# Patient Record
Sex: FEMALE | Race: WHITE | NOT HISPANIC OR LATINO | Employment: FULL TIME | URBAN - METROPOLITAN AREA
[De-identification: names, ages, dates, MRNs, and addresses within clinical notes are randomized per-mention and may not be internally consistent; named-entity substitution may affect disease eponyms.]

---

## 2021-05-10 ENCOUNTER — LAB (OUTPATIENT)
Dept: LAB | Facility: HOSPITAL | Age: 26
End: 2021-05-10

## 2021-05-10 ENCOUNTER — TRANSCRIBE ORDERS (OUTPATIENT)
Dept: LAB | Facility: HOSPITAL | Age: 26
End: 2021-05-10

## 2021-05-10 DIAGNOSIS — Z3A.01 LESS THAN 8 WEEKS GESTATION OF PREGNANCY: ICD-10-CM

## 2021-05-10 DIAGNOSIS — Z34.81 PRENATAL CARE, SUBSEQUENT PREGNANCY, FIRST TRIMESTER: ICD-10-CM

## 2021-05-10 DIAGNOSIS — Z3A.01 LESS THAN 8 WEEKS GESTATION OF PREGNANCY: Primary | ICD-10-CM

## 2021-05-10 LAB
ABO GROUP BLD: NORMAL
AMPHET+METHAMPHET UR QL: NEGATIVE
AMPHETAMINES UR QL: NEGATIVE
BARBITURATES UR QL SCN: NEGATIVE
BASOPHILS # BLD AUTO: 0.05 10*3/MM3 (ref 0–0.2)
BASOPHILS NFR BLD AUTO: 0.7 % (ref 0–1.5)
BENZODIAZ UR QL SCN: NEGATIVE
BILIRUB UR QL STRIP: NEGATIVE
BLD GP AB SCN SERPL QL: NEGATIVE
BUPRENORPHINE SERPL-MCNC: NEGATIVE NG/ML
CANNABINOIDS SERPL QL: NEGATIVE
CLARITY UR: CLEAR
COCAINE UR QL: NEGATIVE
COLOR UR: YELLOW
DEPRECATED RDW RBC AUTO: 42.3 FL (ref 37–54)
EOSINOPHIL # BLD AUTO: 0.05 10*3/MM3 (ref 0–0.4)
EOSINOPHIL NFR BLD AUTO: 0.7 % (ref 0.3–6.2)
ERYTHROCYTE [DISTWIDTH] IN BLOOD BY AUTOMATED COUNT: 12.6 % (ref 12.3–15.4)
GLUCOSE SERPL-MCNC: 82 MG/DL (ref 65–99)
GLUCOSE UR STRIP-MCNC: NEGATIVE MG/DL
HBV SURFACE AG SERPL QL IA: NORMAL
HCT VFR BLD AUTO: 40.7 % (ref 34–46.6)
HCV AB SER DONR QL: NORMAL
HGB BLD-MCNC: 14.1 G/DL (ref 12–15.9)
HGB UR QL STRIP.AUTO: NEGATIVE
HIV1+2 AB SER QL: NORMAL
IMM GRANULOCYTES # BLD AUTO: 0.03 10*3/MM3 (ref 0–0.05)
IMM GRANULOCYTES NFR BLD AUTO: 0.4 % (ref 0–0.5)
KETONES UR QL STRIP: ABNORMAL
LEUKOCYTE ESTERASE UR QL STRIP.AUTO: NEGATIVE
LYMPHOCYTES # BLD AUTO: 1.23 10*3/MM3 (ref 0.7–3.1)
LYMPHOCYTES NFR BLD AUTO: 18.4 % (ref 19.6–45.3)
MCH RBC QN AUTO: 31.8 PG (ref 26.6–33)
MCHC RBC AUTO-ENTMCNC: 34.6 G/DL (ref 31.5–35.7)
MCV RBC AUTO: 91.7 FL (ref 79–97)
METHADONE UR QL SCN: NEGATIVE
MONOCYTES # BLD AUTO: 1.04 10*3/MM3 (ref 0.1–0.9)
MONOCYTES NFR BLD AUTO: 15.5 % (ref 5–12)
NEUTROPHILS NFR BLD AUTO: 4.3 10*3/MM3 (ref 1.7–7)
NEUTROPHILS NFR BLD AUTO: 64.3 % (ref 42.7–76)
NITRITE UR QL STRIP: NEGATIVE
NRBC BLD AUTO-RTO: 0 /100 WBC (ref 0–0.2)
OPIATES UR QL: NEGATIVE
OXYCODONE UR QL SCN: NEGATIVE
PCP UR QL SCN: NEGATIVE
PH UR STRIP.AUTO: 7.5 [PH] (ref 5–8)
PLATELET # BLD AUTO: 229 10*3/MM3 (ref 140–450)
PMV BLD AUTO: 11 FL (ref 6–12)
PROPOXYPH UR QL: NEGATIVE
PROT UR QL STRIP: NEGATIVE
RBC # BLD AUTO: 4.44 10*6/MM3 (ref 3.77–5.28)
RH BLD: POSITIVE
SP GR UR STRIP: 1.01 (ref 1–1.03)
TRICYCLICS UR QL SCN: NEGATIVE
TSH SERPL DL<=0.05 MIU/L-ACNC: 3.55 UIU/ML (ref 0.27–4.2)
UROBILINOGEN UR QL STRIP: ABNORMAL
WBC # BLD AUTO: 6.7 10*3/MM3 (ref 3.4–10.8)

## 2021-05-10 PROCEDURE — 86803 HEPATITIS C AB TEST: CPT

## 2021-05-10 PROCEDURE — 36415 COLL VENOUS BLD VENIPUNCTURE: CPT

## 2021-05-10 PROCEDURE — 82947 ASSAY GLUCOSE BLOOD QUANT: CPT

## 2021-05-10 PROCEDURE — 84443 ASSAY THYROID STIM HORMONE: CPT

## 2021-05-10 PROCEDURE — 80081 OBSTETRIC PANEL INC HIV TSTG: CPT

## 2021-05-10 PROCEDURE — 81003 URINALYSIS AUTO W/O SCOPE: CPT

## 2021-05-10 PROCEDURE — 80306 DRUG TEST PRSMV INSTRMNT: CPT

## 2021-05-11 LAB
RPR SER QL: NORMAL
RUBV IGG SERPL IA-ACNC: POSITIVE

## 2021-10-04 ENCOUNTER — TRANSCRIBE ORDERS (OUTPATIENT)
Dept: LAB | Facility: HOSPITAL | Age: 26
End: 2021-10-04

## 2021-10-04 ENCOUNTER — LAB (OUTPATIENT)
Dept: LAB | Facility: HOSPITAL | Age: 26
End: 2021-10-04

## 2021-10-04 DIAGNOSIS — Z34.83 PRENATAL CARE, SUBSEQUENT PREGNANCY, THIRD TRIMESTER: ICD-10-CM

## 2021-10-04 DIAGNOSIS — Z3A.28 28 WEEKS GESTATION OF PREGNANCY: ICD-10-CM

## 2021-10-04 DIAGNOSIS — Z3A.28 28 WEEKS GESTATION OF PREGNANCY: Primary | ICD-10-CM

## 2021-10-04 LAB
BLD GP AB SCN SERPL QL: NEGATIVE
DEPRECATED RDW RBC AUTO: 42.2 FL (ref 37–54)
ERYTHROCYTE [DISTWIDTH] IN BLOOD BY AUTOMATED COUNT: 12.2 % (ref 12.3–15.4)
GLUCOSE 1H P 100 G GLC PO SERPL-MCNC: 144 MG/DL (ref 65–139)
HCT VFR BLD AUTO: 33.2 % (ref 34–46.6)
HGB BLD-MCNC: 11 G/DL (ref 12–15.9)
MCH RBC QN AUTO: 31.3 PG (ref 26.6–33)
MCHC RBC AUTO-ENTMCNC: 33.1 G/DL (ref 31.5–35.7)
MCV RBC AUTO: 94.6 FL (ref 79–97)
PLATELET # BLD AUTO: 219 10*3/MM3 (ref 140–450)
PMV BLD AUTO: 10.8 FL (ref 6–12)
RBC # BLD AUTO: 3.51 10*6/MM3 (ref 3.77–5.28)
WBC # BLD AUTO: 9.2 10*3/MM3 (ref 3.4–10.8)

## 2021-10-04 PROCEDURE — 82962 GLUCOSE BLOOD TEST: CPT | Performed by: ADVANCED PRACTICE MIDWIFE

## 2021-10-04 PROCEDURE — 86850 RBC ANTIBODY SCREEN: CPT

## 2021-10-04 PROCEDURE — 36415 COLL VENOUS BLD VENIPUNCTURE: CPT

## 2021-10-04 PROCEDURE — 82950 GLUCOSE TEST: CPT

## 2021-10-04 PROCEDURE — 85027 COMPLETE CBC AUTOMATED: CPT

## 2021-10-11 ENCOUNTER — LAB (OUTPATIENT)
Dept: LAB | Facility: HOSPITAL | Age: 26
End: 2021-10-11

## 2021-10-11 ENCOUNTER — TRANSCRIBE ORDERS (OUTPATIENT)
Dept: LAB | Facility: HOSPITAL | Age: 26
End: 2021-10-11

## 2021-10-11 DIAGNOSIS — O99.810 ABNORMAL MATERNAL GLUCOSE TOLERANCE, ANTEPARTUM: ICD-10-CM

## 2021-10-11 DIAGNOSIS — O99.810 ABNORMAL MATERNAL GLUCOSE TOLERANCE, ANTEPARTUM: Primary | ICD-10-CM

## 2021-10-11 LAB
GLUCOSE 1H P 100 G GLC PO SERPL-MCNC: 159 MG/DL (ref 74–180)
GLUCOSE 2H P 100 G GLC PO SERPL-MCNC: 137 MG/DL (ref 74–155)
GLUCOSE 3H P 100 G GLC PO SERPL-MCNC: 101 MG/DL (ref 74–140)
GLUCOSE P FAST SERPL-MCNC: 88 MG/DL (ref 74–106)

## 2021-10-11 PROCEDURE — 82951 GLUCOSE TOLERANCE TEST (GTT): CPT

## 2021-10-11 PROCEDURE — 82962 GLUCOSE BLOOD TEST: CPT | Performed by: ADVANCED PRACTICE MIDWIFE

## 2021-10-11 PROCEDURE — 36415 COLL VENOUS BLD VENIPUNCTURE: CPT

## 2021-10-11 PROCEDURE — 82952 GTT-ADDED SAMPLES: CPT

## 2021-11-17 ENCOUNTER — HOSPITAL ENCOUNTER (OUTPATIENT)
Facility: HOSPITAL | Age: 26
Setting detail: OBSERVATION
Discharge: HOME | End: 2021-11-17
Attending: OBSTETRICS & GYNECOLOGY | Admitting: OBSTETRICS & GYNECOLOGY

## 2021-11-17 VITALS
HEIGHT: 62 IN | DIASTOLIC BLOOD PRESSURE: 73 MMHG | HEART RATE: 110 BPM | RESPIRATION RATE: 16 BRPM | WEIGHT: 180 LBS | BODY MASS INDEX: 33.13 KG/M2 | SYSTOLIC BLOOD PRESSURE: 129 MMHG | TEMPERATURE: 98.5 F

## 2021-11-17 PROBLEM — O47.03 PRETERM UTERINE CONTRACTIONS IN THIRD TRIMESTER, ANTEPARTUM: Status: ACTIVE | Noted: 2021-11-17

## 2021-11-17 LAB
BACTERIA UR QL AUTO: ABNORMAL /HPF
BILIRUB UR QL STRIP: NEGATIVE
CLARITY UR: CLEAR
COLOR UR: YELLOW
GLUCOSE UR STRIP-MCNC: NEGATIVE MG/DL
HGB UR QL STRIP.AUTO: NEGATIVE
HYALINE CASTS UR QL AUTO: ABNORMAL /LPF
KETONES UR QL STRIP: NEGATIVE
LEUKOCYTE ESTERASE UR QL STRIP.AUTO: ABNORMAL
NITRITE UR QL STRIP: NEGATIVE
PH UR STRIP.AUTO: 7 [PH] (ref 5–8)
PROT UR QL STRIP: NEGATIVE
RBC # UR: ABNORMAL /HPF
REF LAB TEST METHOD: ABNORMAL
SP GR UR STRIP: 1 (ref 1–1.03)
SQUAMOUS #/AREA URNS HPF: ABNORMAL /HPF
UROBILINOGEN UR QL STRIP: ABNORMAL
WBC UR QL AUTO: ABNORMAL /HPF

## 2021-11-17 PROCEDURE — 25010000002 TERBUTALINE PER 1 MG: Performed by: OBSTETRICS & GYNECOLOGY

## 2021-11-17 PROCEDURE — 87086 URINE CULTURE/COLONY COUNT: CPT

## 2021-11-17 PROCEDURE — 59025 FETAL NON-STRESS TEST: CPT | Performed by: OBSTETRICS & GYNECOLOGY

## 2021-11-17 PROCEDURE — 59025 FETAL NON-STRESS TEST: CPT

## 2021-11-17 PROCEDURE — 99219 PR INITIAL OBSERVATION CARE/DAY 50 MINUTES: CPT | Performed by: OBSTETRICS & GYNECOLOGY

## 2021-11-17 PROCEDURE — G0463 HOSPITAL OUTPT CLINIC VISIT: HCPCS

## 2021-11-17 PROCEDURE — 96372 THER/PROPH/DIAG INJ SC/IM: CPT

## 2021-11-17 PROCEDURE — 81001 URINALYSIS AUTO W/SCOPE: CPT

## 2021-11-17 RX ORDER — TERBUTALINE SULFATE 1 MG/ML
0.25 INJECTION, SOLUTION SUBCUTANEOUS ONCE
Status: COMPLETED | OUTPATIENT
Start: 2021-11-17 | End: 2021-11-17

## 2021-11-17 RX ORDER — PRENATAL WITH FERROUS FUM AND FOLIC ACID 3080; 920; 120; 400; 22; 1.84; 3; 20; 10; 1; 12; 200; 27; 25; 2 [IU]/1; [IU]/1; MG/1; [IU]/1; MG/1; MG/1; MG/1; MG/1; MG/1; MG/1; UG/1; MG/1; MG/1; MG/1; MG/1
TABLET ORAL DAILY
COMMUNITY

## 2021-11-17 RX ADMIN — TERBUTALINE SULFATE 0.25 MG: 1 INJECTION, SOLUTION SUBCUTANEOUS at 03:03

## 2021-11-17 NOTE — H&P
"Santa Ann  1995  [id number]  [id number]    Delayed entry due to epic being down    CC: contractions  HPI:  Patient is 26 y.o. female   currently at 34w1d presents with c/o uterine contractions, onset ~2100, intermittent, rates 1-2/10, non-radiating, denies assoc vag bleeding or SROM.  [place] ~2hrs prior to onset contractions.  Good FM X 2.  PNC comp by di/di twins and baby A breech    PMH:  Current meds: PNV  Illnesses: none  Surgeries: none  Allergies: NKDA    Past OB History:       OB History    Para Term  AB Living   1 0 0 0 0 0   SAB IAB Ectopic Molar Multiple Live Births   0 0 0 0 0 0      # Outcome Date GA Lbr Tomas/2nd Weight Sex Delivery Anes PTL Lv   1 Current                     SH: tob neg , EtOH neg, drugs neg  FH: heart dz neg , diabetes neg , cancer neg    General ROS: contractions, edema.   All other systems reviewed and are negative.      Physical Examination: General appearance - alert, well appearing, and in no distress  Vital signs - /73   Pulse 110   Temp 98.5 °F (36.9 °C) (Oral)   Resp 16   Ht 157.5 cm (62\")   Wt 81.6 kg (180 lb)   BMI 32.92 kg/m²   HEENT: normocephalic, atraumatic,oropharynx clear, appearance of ears and nose normal  Neck - supple, no significant adenopathy, no thyromegaly  Lymphatics - no palpable lymphadenopathy in the neck or groin, no hepatosplenomegaly  Chest - clear to auscultation, no wheezes, rales or rhonchi, respiratory effort non-labored  Heart - normal rate, regular rhythm, no murmurs, rubs, clicks or gallops, no JVD, no lower extremity edema  Abdomen - soft, nontender, nondistended, no masses, no hepatosplenomegaly  no rebound tenderness noted, bowel sounds normal  Vaginal Exam: cl-1/70%/-2, no blood in vault ,external genitalia normal  Extremities - no pedal edema noted, no calf tend  Skin -warm and dry, normal coloration and turgor, no rashes, no suspicious skin lesions noted    Fetal monitoring: indication " contractions , onset 0225 , offset 0251 , baseline 150(A), 140(B) , mod BTB variability(A and B) , multiple accels (15 X 15)(A and B), no decels(A or B), irreg contractions, interpretation reactive NST (A and B)    Radiology US: baby A breech, B vertex    Assessment 1)IUP 34 1/7 weeks   2) contractions- likely due to intercourse, no evid  labor   3)di/di twins   4)baby A breech    Plan 1)observe   2)po hydration   3)sub Q Terb   4)home   5)keep next sched appt    Sanchez Zuleta MD  2021  04:42 EST

## 2021-11-17 NOTE — PROGRESS NOTES
Santa Segura Marissa  [id number]  1995      ccUA with 3-5WBC's, 1+bact, 0 epis  Pt already has been discharged.  Please f/u culture over the next few days  Pt called and asked to f/u Fri am by phone with her OB's office to check culture result    Sanchez Zuleta MD  11/17/2021  06:40 EST

## 2021-11-18 LAB — BACTERIA SPEC AEROBE CULT: NO GROWTH

## 2021-11-25 ENCOUNTER — ANESTHESIA EVENT (OUTPATIENT)
Dept: LABOR AND DELIVERY | Facility: HOSPITAL | Age: 26
End: 2021-11-25

## 2021-11-25 ENCOUNTER — ANESTHESIA (OUTPATIENT)
Dept: LABOR AND DELIVERY | Facility: HOSPITAL | Age: 26
End: 2021-11-25

## 2021-11-25 ENCOUNTER — HOSPITAL ENCOUNTER (INPATIENT)
Facility: HOSPITAL | Age: 26
LOS: 4 days | Discharge: HOME | End: 2021-11-29
Attending: OBSTETRICS & GYNECOLOGY | Admitting: OBSTETRICS & GYNECOLOGY

## 2021-11-25 DIAGNOSIS — Z98.891 STATUS POST PRIMARY LOW TRANSVERSE CESAREAN SECTION: Primary | ICD-10-CM

## 2021-11-25 PROBLEM — O47.03 PRETERM UTERINE CONTRACTIONS IN THIRD TRIMESTER, ANTEPARTUM: Status: RESOLVED | Noted: 2021-11-17 | Resolved: 2021-11-25

## 2021-11-25 LAB
ABO GROUP BLD: NORMAL
ALP SERPL-CCNC: 202 U/L (ref 39–117)
ALT SERPL W P-5'-P-CCNC: 7 U/L (ref 1–33)
AST SERPL-CCNC: 18 U/L (ref 1–32)
ATMOSPHERIC PRESS: ABNORMAL MM[HG]
ATMOSPHERIC PRESS: ABNORMAL MM[HG]
BASE EXCESS BLDCOA CALC-SCNC: -2.3 MMOL/L (ref 0–2)
BASE EXCESS BLDCOV CALC-SCNC: -1.9 MMOL/L (ref 0–2)
BDY SITE: ABNORMAL
BDY SITE: ABNORMAL
BILIRUB SERPL-MCNC: 0.2 MG/DL (ref 0–1.2)
BLD GP AB SCN SERPL QL: NEGATIVE
BODY TEMPERATURE: 37 C
BODY TEMPERATURE: 37 C
CO2 BLDA-SCNC: 26.3 MMOL/L (ref 22–33)
CO2 BLDA-SCNC: 27.8 MMOL/L (ref 22–33)
COLLECT TME SMN: ABNORMAL
CREAT SERPL-MCNC: 0.74 MG/DL (ref 0.57–1)
DEPRECATED RDW RBC AUTO: 38.5 FL (ref 37–54)
EPAP: 0
EPAP: 0
ERYTHROCYTE [DISTWIDTH] IN BLOOD BY AUTOMATED COUNT: 12.3 % (ref 12.3–15.4)
HCO3 BLDCOA-SCNC: 26.1 MMOL/L (ref 16.9–20.5)
HCO3 BLDCOV-SCNC: 24.8 MMOL/L (ref 18.6–21.4)
HCT VFR BLD AUTO: 34.3 % (ref 34–46.6)
HGB BLD-MCNC: 11.6 G/DL (ref 12–15.9)
HGB BLDA-MCNC: 16.9 G/DL (ref 14–18)
HGB BLDA-MCNC: 17.4 G/DL (ref 14–18)
INHALED O2 CONCENTRATION: 21 %
INHALED O2 CONCENTRATION: 21 %
IPAP: 0
IPAP: 0
LDH SERPL-CCNC: 185 U/L (ref 135–214)
MCH RBC QN AUTO: 29.1 PG (ref 26.6–33)
MCHC RBC AUTO-ENTMCNC: 33.8 G/DL (ref 31.5–35.7)
MCV RBC AUTO: 86 FL (ref 79–97)
MODALITY: ABNORMAL
MODALITY: ABNORMAL
NOTE: ABNORMAL
NOTE: ABNORMAL
PAW @ PEAK INSP FLOW SETTING VENT: 0 CMH2O
PAW @ PEAK INSP FLOW SETTING VENT: 0 CMH2O
PCO2 BLDCOA: 57.2 MMHG (ref 43.3–54.9)
PCO2 BLDCOV: 47.6 MM HG (ref 28–40)
PH BLDCOA: 7.27 PH UNITS (ref 7.22–7.3)
PH BLDCOV: 7.33 PH UNITS (ref 7.31–7.37)
PLATELET # BLD AUTO: 254 10*3/MM3 (ref 140–450)
PMV BLD AUTO: 11.6 FL (ref 6–12)
PO2 BLDCOA: 7 MMHG (ref 11.5–43.3)
PO2 BLDCOV: 15.5 MM HG (ref 21–31)
POC AMNISURE: POSITIVE
RBC # BLD AUTO: 3.99 10*6/MM3 (ref 3.77–5.28)
RH BLD: POSITIVE
SAO2 % BLDCOA: 6.1 %
SAO2 % BLDCOA: ABNORMAL %
SAO2 % BLDCOV: 27.4 %
SARS-COV-2 RDRP RESP QL NAA+PROBE: NORMAL
T&S EXPIRATION DATE: NORMAL
TOTAL RATE: 0 BREATHS/MINUTE
TOTAL RATE: 0 BREATHS/MINUTE
URATE SERPL-MCNC: 5.3 MG/DL (ref 2.4–5.7)
WBC NRBC COR # BLD: 8.86 10*3/MM3 (ref 3.4–10.8)

## 2021-11-25 PROCEDURE — 84112 EVAL AMNIOTIC FLUID PROTEIN: CPT | Performed by: OBSTETRICS & GYNECOLOGY

## 2021-11-25 PROCEDURE — 82805 BLOOD GASES W/O2 SATURATION: CPT

## 2021-11-25 PROCEDURE — 25010000002 METOCLOPRAMIDE PER 10 MG: Performed by: ANESTHESIOLOGY

## 2021-11-25 PROCEDURE — 82247 BILIRUBIN TOTAL: CPT | Performed by: OBSTETRICS & GYNECOLOGY

## 2021-11-25 PROCEDURE — 84075 ASSAY ALKALINE PHOSPHATASE: CPT | Performed by: OBSTETRICS & GYNECOLOGY

## 2021-11-25 PROCEDURE — 0 CEFAZOLIN IN DEXTROSE 2-4 GM/100ML-% SOLUTION: Performed by: OBSTETRICS & GYNECOLOGY

## 2021-11-25 PROCEDURE — 84450 TRANSFERASE (AST) (SGOT): CPT | Performed by: OBSTETRICS & GYNECOLOGY

## 2021-11-25 PROCEDURE — 25010000002 ONDANSETRON PER 1 MG: Performed by: ANESTHESIOLOGY

## 2021-11-25 PROCEDURE — 59025 FETAL NON-STRESS TEST: CPT | Performed by: OBSTETRICS & GYNECOLOGY

## 2021-11-25 PROCEDURE — 25010000002 KETOROLAC TROMETHAMINE PER 15 MG: Performed by: OBSTETRICS & GYNECOLOGY

## 2021-11-25 PROCEDURE — 84550 ASSAY OF BLOOD/URIC ACID: CPT | Performed by: OBSTETRICS & GYNECOLOGY

## 2021-11-25 PROCEDURE — 0 MORPHINE PER 10 MG: Performed by: ANESTHESIOLOGY

## 2021-11-25 PROCEDURE — 84460 ALANINE AMINO (ALT) (SGPT): CPT | Performed by: OBSTETRICS & GYNECOLOGY

## 2021-11-25 PROCEDURE — 82565 ASSAY OF CREATININE: CPT | Performed by: OBSTETRICS & GYNECOLOGY

## 2021-11-25 PROCEDURE — 86901 BLOOD TYPING SEROLOGIC RH(D): CPT | Performed by: OBSTETRICS & GYNECOLOGY

## 2021-11-25 PROCEDURE — 99222 1ST HOSP IP/OBS MODERATE 55: CPT | Performed by: OBSTETRICS & GYNECOLOGY

## 2021-11-25 PROCEDURE — 83615 LACTATE (LD) (LDH) ENZYME: CPT | Performed by: OBSTETRICS & GYNECOLOGY

## 2021-11-25 PROCEDURE — 88307 TISSUE EXAM BY PATHOLOGIST: CPT | Performed by: OBSTETRICS & GYNECOLOGY

## 2021-11-25 PROCEDURE — 85027 COMPLETE CBC AUTOMATED: CPT | Performed by: OBSTETRICS & GYNECOLOGY

## 2021-11-25 PROCEDURE — 25010000002 FENTANYL CITRATE (PF) 50 MCG/ML SOLUTION: Performed by: ANESTHESIOLOGY

## 2021-11-25 PROCEDURE — 25010000002 MIDAZOLAM PER 1 MG: Performed by: ANESTHESIOLOGY

## 2021-11-25 PROCEDURE — 86900 BLOOD TYPING SEROLOGIC ABO: CPT | Performed by: OBSTETRICS & GYNECOLOGY

## 2021-11-25 PROCEDURE — C1889 IMPLANT/INSERT DEVICE, NOC: HCPCS | Performed by: OBSTETRICS & GYNECOLOGY

## 2021-11-25 PROCEDURE — 86850 RBC ANTIBODY SCREEN: CPT | Performed by: OBSTETRICS & GYNECOLOGY

## 2021-11-25 PROCEDURE — 87635 SARS-COV-2 COVID-19 AMP PRB: CPT | Performed by: OBSTETRICS & GYNECOLOGY

## 2021-11-25 DEVICE — HEMOST ABS SURGICEL PWDR 3GM: Type: IMPLANTABLE DEVICE | Status: FUNCTIONAL

## 2021-11-25 RX ORDER — ONDANSETRON 4 MG/1
4 TABLET, FILM COATED ORAL EVERY 6 HOURS PRN
Status: DISCONTINUED | OUTPATIENT
Start: 2021-11-25 | End: 2021-11-29 | Stop reason: HOSPADM

## 2021-11-25 RX ORDER — ACETAMINOPHEN 500 MG
1000 TABLET ORAL ONCE
Status: COMPLETED | OUTPATIENT
Start: 2021-11-25 | End: 2021-11-25

## 2021-11-25 RX ORDER — LANOLIN
1 CREAM (ML) TOPICAL
Status: DISCONTINUED | OUTPATIENT
Start: 2021-11-25 | End: 2021-11-29 | Stop reason: HOSPADM

## 2021-11-25 RX ORDER — ONDANSETRON 2 MG/ML
4 INJECTION INTRAMUSCULAR; INTRAVENOUS EVERY 6 HOURS PRN
Status: DISCONTINUED | OUTPATIENT
Start: 2021-11-25 | End: 2021-11-29 | Stop reason: HOSPADM

## 2021-11-25 RX ORDER — HYDROCORTISONE 25 MG/G
1 CREAM TOPICAL AS NEEDED
Status: DISCONTINUED | OUTPATIENT
Start: 2021-11-25 | End: 2021-11-29 | Stop reason: HOSPADM

## 2021-11-25 RX ORDER — SODIUM CHLORIDE 0.9 % (FLUSH) 0.9 %
10 SYRINGE (ML) INJECTION AS NEEDED
Status: DISCONTINUED | OUTPATIENT
Start: 2021-11-25 | End: 2021-11-25 | Stop reason: HOSPADM

## 2021-11-25 RX ORDER — METOCLOPRAMIDE HYDROCHLORIDE 5 MG/ML
INJECTION INTRAMUSCULAR; INTRAVENOUS AS NEEDED
Status: DISCONTINUED | OUTPATIENT
Start: 2021-11-25 | End: 2021-11-25 | Stop reason: SURG

## 2021-11-25 RX ORDER — PRENATAL VIT/IRON FUM/FOLIC AC 27MG-0.8MG
1 TABLET ORAL DAILY
Status: DISCONTINUED | OUTPATIENT
Start: 2021-11-25 | End: 2021-11-29 | Stop reason: HOSPADM

## 2021-11-25 RX ORDER — FENTANYL CITRATE 50 UG/ML
INJECTION, SOLUTION INTRAMUSCULAR; INTRAVENOUS AS NEEDED
Status: DISCONTINUED | OUTPATIENT
Start: 2021-11-25 | End: 2021-11-25 | Stop reason: SURG

## 2021-11-25 RX ORDER — ONDANSETRON 2 MG/ML
4 INJECTION INTRAMUSCULAR; INTRAVENOUS ONCE AS NEEDED
Status: ACTIVE | OUTPATIENT
Start: 2021-11-25 | End: 2021-11-26

## 2021-11-25 RX ORDER — OXYCODONE HYDROCHLORIDE 5 MG/1
5 TABLET ORAL EVERY 4 HOURS PRN
Status: DISCONTINUED | OUTPATIENT
Start: 2021-11-25 | End: 2021-11-29 | Stop reason: HOSPADM

## 2021-11-25 RX ORDER — DIPHENHYDRAMINE HYDROCHLORIDE 50 MG/ML
25 INJECTION INTRAMUSCULAR; INTRAVENOUS ONCE AS NEEDED
Status: DISCONTINUED | OUTPATIENT
Start: 2021-11-25 | End: 2021-11-29 | Stop reason: HOSPADM

## 2021-11-25 RX ORDER — ACETAMINOPHEN 325 MG/1
650 TABLET ORAL EVERY 6 HOURS
Status: DISCONTINUED | OUTPATIENT
Start: 2021-11-26 | End: 2021-11-29 | Stop reason: HOSPADM

## 2021-11-25 RX ORDER — SODIUM CHLORIDE 0.9 % (FLUSH) 0.9 %
3 SYRINGE (ML) INJECTION EVERY 12 HOURS SCHEDULED
Status: DISCONTINUED | OUTPATIENT
Start: 2021-11-25 | End: 2021-11-25 | Stop reason: HOSPADM

## 2021-11-25 RX ORDER — ONDANSETRON 2 MG/ML
INJECTION INTRAMUSCULAR; INTRAVENOUS AS NEEDED
Status: DISCONTINUED | OUTPATIENT
Start: 2021-11-25 | End: 2021-11-25 | Stop reason: SURG

## 2021-11-25 RX ORDER — NALOXONE HCL 0.4 MG/ML
0.4 VIAL (ML) INJECTION
Status: ACTIVE | OUTPATIENT
Start: 2021-11-25 | End: 2021-11-26

## 2021-11-25 RX ORDER — ALUMINA, MAGNESIA, AND SIMETHICONE 2400; 2400; 240 MG/30ML; MG/30ML; MG/30ML
15 SUSPENSION ORAL EVERY 4 HOURS PRN
Status: DISCONTINUED | OUTPATIENT
Start: 2021-11-25 | End: 2021-11-29 | Stop reason: HOSPADM

## 2021-11-25 RX ORDER — OXYTOCIN/0.9 % SODIUM CHLORIDE 30/500 ML
650 PLASTIC BAG, INJECTION (ML) INTRAVENOUS ONCE
Status: COMPLETED | OUTPATIENT
Start: 2021-11-25 | End: 2021-11-25

## 2021-11-25 RX ORDER — SODIUM CHLORIDE, SODIUM LACTATE, POTASSIUM CHLORIDE, CALCIUM CHLORIDE 600; 310; 30; 20 MG/100ML; MG/100ML; MG/100ML; MG/100ML
125 INJECTION, SOLUTION INTRAVENOUS CONTINUOUS
Status: DISCONTINUED | OUTPATIENT
Start: 2021-11-25 | End: 2021-11-29 | Stop reason: HOSPADM

## 2021-11-25 RX ORDER — MORPHINE SULFATE 0.5 MG/ML
INJECTION, SOLUTION EPIDURAL; INTRATHECAL; INTRAVENOUS AS NEEDED
Status: DISCONTINUED | OUTPATIENT
Start: 2021-11-25 | End: 2021-11-25 | Stop reason: SURG

## 2021-11-25 RX ORDER — LIDOCAINE HYDROCHLORIDE 10 MG/ML
5 INJECTION, SOLUTION EPIDURAL; INFILTRATION; INTRACAUDAL; PERINEURAL AS NEEDED
Status: DISCONTINUED | OUTPATIENT
Start: 2021-11-25 | End: 2021-11-25 | Stop reason: HOSPADM

## 2021-11-25 RX ORDER — TRISODIUM CITRATE DIHYDRATE AND CITRIC ACID MONOHYDRATE 500; 334 MG/5ML; MG/5ML
30 SOLUTION ORAL ONCE
Status: DISCONTINUED | OUTPATIENT
Start: 2021-11-25 | End: 2021-11-25 | Stop reason: HOSPADM

## 2021-11-25 RX ORDER — KETOROLAC TROMETHAMINE 30 MG/ML
15 INJECTION, SOLUTION INTRAMUSCULAR; INTRAVENOUS EVERY 6 HOURS
Status: COMPLETED | OUTPATIENT
Start: 2021-11-25 | End: 2021-11-26

## 2021-11-25 RX ORDER — CALCIUM CARBONATE 200(500)MG
1 TABLET,CHEWABLE ORAL EVERY 4 HOURS PRN
Status: DISCONTINUED | OUTPATIENT
Start: 2021-11-25 | End: 2021-11-29 | Stop reason: HOSPADM

## 2021-11-25 RX ORDER — ACETAMINOPHEN 500 MG
1000 TABLET ORAL EVERY 6 HOURS
Status: COMPLETED | OUTPATIENT
Start: 2021-11-25 | End: 2021-11-26

## 2021-11-25 RX ORDER — CEFAZOLIN SODIUM 2 G/100ML
2 INJECTION, SOLUTION INTRAVENOUS ONCE
Status: COMPLETED | OUTPATIENT
Start: 2021-11-25 | End: 2021-11-25

## 2021-11-25 RX ORDER — FAMOTIDINE 10 MG/ML
INJECTION, SOLUTION INTRAVENOUS AS NEEDED
Status: DISCONTINUED | OUTPATIENT
Start: 2021-11-25 | End: 2021-11-25 | Stop reason: SURG

## 2021-11-25 RX ORDER — OXYTOCIN-SODIUM CHLORIDE 0.9% IV SOLN 30 UNIT/500ML 30-0.9/5 UT/ML-%
SOLUTION INTRAVENOUS AS NEEDED
Status: DISCONTINUED | OUTPATIENT
Start: 2021-11-25 | End: 2021-11-25 | Stop reason: SURG

## 2021-11-25 RX ORDER — OXYCODONE HYDROCHLORIDE 5 MG/1
10 TABLET ORAL EVERY 4 HOURS PRN
Status: DISCONTINUED | OUTPATIENT
Start: 2021-11-25 | End: 2021-11-29 | Stop reason: HOSPADM

## 2021-11-25 RX ORDER — METHYLERGONOVINE MALEATE 0.2 MG/ML
200 INJECTION INTRAVENOUS ONCE AS NEEDED
Status: DISCONTINUED | OUTPATIENT
Start: 2021-11-25 | End: 2021-11-29 | Stop reason: HOSPADM

## 2021-11-25 RX ORDER — OXYTOCIN/0.9 % SODIUM CHLORIDE 30/500 ML
85 PLASTIC BAG, INJECTION (ML) INTRAVENOUS ONCE
Status: COMPLETED | OUTPATIENT
Start: 2021-11-25 | End: 2021-11-25

## 2021-11-25 RX ORDER — MISOPROSTOL 200 UG/1
800 TABLET ORAL AS NEEDED
Status: DISCONTINUED | OUTPATIENT
Start: 2021-11-25 | End: 2021-11-29 | Stop reason: HOSPADM

## 2021-11-25 RX ORDER — DIPHENHYDRAMINE HYDROCHLORIDE 50 MG/ML
25 INJECTION INTRAMUSCULAR; INTRAVENOUS EVERY 4 HOURS PRN
Status: DISCONTINUED | OUTPATIENT
Start: 2021-11-25 | End: 2021-11-29 | Stop reason: HOSPADM

## 2021-11-25 RX ORDER — DIPHENHYDRAMINE HCL 25 MG
25 CAPSULE ORAL EVERY 4 HOURS PRN
Status: DISCONTINUED | OUTPATIENT
Start: 2021-11-25 | End: 2021-11-29 | Stop reason: HOSPADM

## 2021-11-25 RX ORDER — DOCUSATE SODIUM 100 MG/1
100 CAPSULE, LIQUID FILLED ORAL 2 TIMES DAILY PRN
Status: DISCONTINUED | OUTPATIENT
Start: 2021-11-25 | End: 2021-11-29 | Stop reason: HOSPADM

## 2021-11-25 RX ORDER — MIDAZOLAM HYDROCHLORIDE 1 MG/ML
INJECTION INTRAMUSCULAR; INTRAVENOUS AS NEEDED
Status: DISCONTINUED | OUTPATIENT
Start: 2021-11-25 | End: 2021-11-25 | Stop reason: SURG

## 2021-11-25 RX ORDER — CARBOPROST TROMETHAMINE 250 UG/ML
250 INJECTION, SOLUTION INTRAMUSCULAR AS NEEDED
Status: DISCONTINUED | OUTPATIENT
Start: 2021-11-25 | End: 2021-11-29 | Stop reason: HOSPADM

## 2021-11-25 RX ORDER — SIMETHICONE 80 MG
80 TABLET,CHEWABLE ORAL 4 TIMES DAILY PRN
Status: DISCONTINUED | OUTPATIENT
Start: 2021-11-25 | End: 2021-11-29 | Stop reason: HOSPADM

## 2021-11-25 RX ORDER — BUPIVACAINE HYDROCHLORIDE 7.5 MG/ML
INJECTION, SOLUTION INTRASPINAL AS NEEDED
Status: DISCONTINUED | OUTPATIENT
Start: 2021-11-25 | End: 2021-11-25 | Stop reason: SURG

## 2021-11-25 RX ORDER — KETOROLAC TROMETHAMINE 30 MG/ML
30 INJECTION, SOLUTION INTRAMUSCULAR; INTRAVENOUS ONCE
Status: DISCONTINUED | OUTPATIENT
Start: 2021-11-25 | End: 2021-11-25 | Stop reason: HOSPADM

## 2021-11-25 RX ADMIN — MORPHINE SULFATE 0.15 MG: 0.5 INJECTION, SOLUTION EPIDURAL; INTRATHECAL; INTRAVENOUS at 09:37

## 2021-11-25 RX ADMIN — SODIUM CHLORIDE, POTASSIUM CHLORIDE, SODIUM LACTATE AND CALCIUM CHLORIDE: 600; 310; 30; 20 INJECTION, SOLUTION INTRAVENOUS at 10:13

## 2021-11-25 RX ADMIN — Medication 85 ML/HR: at 10:30

## 2021-11-25 RX ADMIN — ACETAMINOPHEN 1000 MG: 500 TABLET, FILM COATED ORAL at 13:17

## 2021-11-25 RX ADMIN — ACETAMINOPHEN 1000 MG: 500 TABLET, FILM COATED ORAL at 07:27

## 2021-11-25 RX ADMIN — KETOROLAC TROMETHAMINE 15 MG: 30 INJECTION, SOLUTION INTRAMUSCULAR; INTRAVENOUS at 16:22

## 2021-11-25 RX ADMIN — FENTANYL CITRATE 20 MCG: 50 INJECTION, SOLUTION INTRAMUSCULAR; INTRAVENOUS at 09:37

## 2021-11-25 RX ADMIN — BUPIVACAINE HYDROCHLORIDE IN DEXTROSE 1.3 ML: 7.5 INJECTION, SOLUTION SUBARACHNOID at 09:37

## 2021-11-25 RX ADMIN — CEFAZOLIN SODIUM 2 G: 2 INJECTION, SOLUTION INTRAVENOUS at 08:43

## 2021-11-25 RX ADMIN — ACETAMINOPHEN 1000 MG: 500 TABLET, FILM COATED ORAL at 20:09

## 2021-11-25 RX ADMIN — OXYTOCIN-SODIUM CHLORIDE 0.9% IV SOLN 30 UNIT/500ML 500 ML: 30-0.9/5 SOLUTION at 09:58

## 2021-11-25 RX ADMIN — FAMOTIDINE 20 MG: 10 INJECTION, SOLUTION INTRAVENOUS at 09:29

## 2021-11-25 RX ADMIN — ONDANSETRON 4 MG: 2 INJECTION INTRAMUSCULAR; INTRAVENOUS at 09:29

## 2021-11-25 RX ADMIN — OXYCODONE 5 MG: 5 TABLET ORAL at 13:17

## 2021-11-25 RX ADMIN — KETOROLAC TROMETHAMINE 30 MG: 30 INJECTION, SOLUTION INTRAMUSCULAR at 11:39

## 2021-11-25 RX ADMIN — SODIUM CITRATE AND CITRIC ACID MONOHYDRATE 30 ML: 500; 334 SOLUTION ORAL at 09:27

## 2021-11-25 RX ADMIN — METOCLOPRAMIDE 10 MG: 5 INJECTION, SOLUTION INTRAMUSCULAR; INTRAVENOUS at 09:29

## 2021-11-25 RX ADMIN — SODIUM CHLORIDE, POTASSIUM CHLORIDE, SODIUM LACTATE AND CALCIUM CHLORIDE 1000 ML: 600; 310; 30; 20 INJECTION, SOLUTION INTRAVENOUS at 07:05

## 2021-11-25 RX ADMIN — Medication 650 ML/HR: at 10:00

## 2021-11-25 RX ADMIN — MIDAZOLAM 1 MG: 1 INJECTION INTRAMUSCULAR; INTRAVENOUS at 09:29

## 2021-11-25 RX ADMIN — OXYTOCIN-SODIUM CHLORIDE 0.9% IV SOLN 30 UNIT/500ML 500 ML: 30-0.9/5 SOLUTION at 10:13

## 2021-11-25 RX ADMIN — SIMETHICONE 80 MG: 80 TABLET, CHEWABLE ORAL at 20:09

## 2021-11-25 RX ADMIN — SODIUM CHLORIDE, POTASSIUM CHLORIDE, SODIUM LACTATE AND CALCIUM CHLORIDE 125 ML/HR: 600; 310; 30; 20 INJECTION, SOLUTION INTRAVENOUS at 08:15

## 2021-11-25 NOTE — ANESTHESIA PREPROCEDURE EVALUATION
Anesthesia Evaluation     Patient summary reviewed and Nursing notes reviewed   NPO Solid Status: > 6 hours             Airway   Mallampati: II  TM distance: >3 FB  Neck ROM: full  No difficulty expected  Dental      Pulmonary - negative pulmonary ROS   Cardiovascular - negative cardio ROS        Neuro/Psych- negative ROS  GI/Hepatic/Renal/Endo - negative ROS     Musculoskeletal (-) negative ROS    Abdominal    Substance History - negative use     OB/GYN    (+) Pregnant (twins.  SROM),         Other - negative ROS                       Anesthesia Plan    ASA 2 - emergent     spinal and ITN       Anesthetic plan, all risks, benefits, and alternatives have been provided, discussed and informed consent has been obtained with: patient.

## 2021-11-25 NOTE — ANESTHESIA PROCEDURE NOTES
Spinal Block      Patient reassessed immediately prior to procedure    Patient location during procedure: OB  Indication:procedure for pain  Performed By  Anesthesiologist: Polina Tee DO  Preanesthetic Checklist  Completed: patient identified, IV checked, risks and benefits discussed, surgical consent, monitors and equipment checked, pre-op evaluation and timeout performed  Spinal Block Prep:  Patient Position:sitting  Sterile Tech:cap, gloves, mask and sterile barriers  Prep:Betadine  Patient Monitoring:blood pressure monitoring and EKG  Spinal Block Procedure  Approach:midline  Guidance:palpation technique  Location:L3-L4  Needle Type:Carlos  Needle Gauge:25 G  Placement of Spinal needle event:cerebrospinal fluid aspirated  Paresthesia: no  Fluid Appearance:clear     Post Assessment  Patient Tolerance:patient tolerated the procedure well with no apparent complications  Complications no

## 2021-11-25 NOTE — ANESTHESIA POSTPROCEDURE EVALUATION
Patient: Santa Ann    Procedure Summary     Date: 21 Room / Location: [facility] LABOR DELIVERY  [facility] LABOR DELIVERY    Anesthesia Start: 929 Anesthesia Stop:     Procedure:  SECTION PRIMARY (N/A Abdomen) Diagnosis:     Surgeons: Ping Heredia DO Provider: Polina Tee DO    Anesthesia Type: spinal, ITN ASA Status: 2 - Emergent          Anesthesia Type: spinal, ITN    Vitals  No vitals data found for the desired time range.  SAT 99%  RR 16  HR 93  /53  T 97.7        Post Anesthesia Care and Evaluation    Patient location during evaluation: bedside  Patient participation: complete - patient participated  Level of consciousness: awake and awake and alert  Pain score: 0  Pain management: satisfactory to patient  Airway patency: patent  Anesthetic complications: No anesthetic complications  PONV Status: none  Cardiovascular status: acceptable, hemodynamically stable and stable  Respiratory status: acceptable  Hydration status: stable  Post Neuraxial Block status: No signs or symptoms of PDPH

## 2021-11-26 LAB
BASOPHILS # BLD AUTO: 0.06 10*3/MM3 (ref 0–0.2)
BASOPHILS NFR BLD AUTO: 0.6 % (ref 0–1.5)
DEPRECATED RDW RBC AUTO: 39.8 FL (ref 37–54)
EOSINOPHIL # BLD AUTO: 0.06 10*3/MM3 (ref 0–0.4)
EOSINOPHIL NFR BLD AUTO: 0.6 % (ref 0.3–6.2)
ERYTHROCYTE [DISTWIDTH] IN BLOOD BY AUTOMATED COUNT: 12.8 % (ref 12.3–15.4)
HCT VFR BLD AUTO: 29.6 % (ref 34–46.6)
HGB BLD-MCNC: 10 G/DL (ref 12–15.9)
IMM GRANULOCYTES # BLD AUTO: 0.06 10*3/MM3 (ref 0–0.05)
IMM GRANULOCYTES NFR BLD AUTO: 0.6 % (ref 0–0.5)
LYMPHOCYTES # BLD AUTO: 1.73 10*3/MM3 (ref 0.7–3.1)
LYMPHOCYTES NFR BLD AUTO: 16.2 % (ref 19.6–45.3)
MCH RBC QN AUTO: 29.3 PG (ref 26.6–33)
MCHC RBC AUTO-ENTMCNC: 33.8 G/DL (ref 31.5–35.7)
MCV RBC AUTO: 86.8 FL (ref 79–97)
MONOCYTES # BLD AUTO: 1.09 10*3/MM3 (ref 0.1–0.9)
MONOCYTES NFR BLD AUTO: 10.2 % (ref 5–12)
NEUTROPHILS NFR BLD AUTO: 7.66 10*3/MM3 (ref 1.7–7)
NEUTROPHILS NFR BLD AUTO: 71.8 % (ref 42.7–76)
NRBC BLD AUTO-RTO: 0 /100 WBC (ref 0–0.2)
PLATELET # BLD AUTO: 201 10*3/MM3 (ref 140–450)
PMV BLD AUTO: 11.4 FL (ref 6–12)
RBC # BLD AUTO: 3.41 10*6/MM3 (ref 3.77–5.28)
WBC NRBC COR # BLD: 10.66 10*3/MM3 (ref 3.4–10.8)

## 2021-11-26 PROCEDURE — 85025 COMPLETE CBC W/AUTO DIFF WBC: CPT | Performed by: OBSTETRICS & GYNECOLOGY

## 2021-11-26 PROCEDURE — 25010000002 KETOROLAC TROMETHAMINE PER 15 MG: Performed by: OBSTETRICS & GYNECOLOGY

## 2021-11-26 RX ORDER — IBUPROFEN 600 MG/1
600 TABLET ORAL EVERY 6 HOURS SCHEDULED
Status: DISCONTINUED | OUTPATIENT
Start: 2021-11-26 | End: 2021-11-29 | Stop reason: HOSPADM

## 2021-11-26 RX ADMIN — OXYCODONE 5 MG: 5 TABLET ORAL at 04:13

## 2021-11-26 RX ADMIN — DOCUSATE SODIUM 100 MG: 100 CAPSULE, LIQUID FILLED ORAL at 07:33

## 2021-11-26 RX ADMIN — ACETAMINOPHEN 650 MG: 325 TABLET, FILM COATED ORAL at 13:12

## 2021-11-26 RX ADMIN — IBUPROFEN 600 MG: 600 TABLET ORAL at 23:45

## 2021-11-26 RX ADMIN — KETOROLAC TROMETHAMINE 15 MG: 30 INJECTION, SOLUTION INTRAMUSCULAR; INTRAVENOUS at 09:55

## 2021-11-26 RX ADMIN — KETOROLAC TROMETHAMINE 15 MG: 30 INJECTION, SOLUTION INTRAMUSCULAR; INTRAVENOUS at 05:44

## 2021-11-26 RX ADMIN — SIMETHICONE 80 MG: 80 TABLET, CHEWABLE ORAL at 07:33

## 2021-11-26 RX ADMIN — PRENATAL VITAMINS-IRON FUMARATE 27 MG IRON-FOLIC ACID 0.8 MG TABLET 1 TABLET: at 07:33

## 2021-11-26 RX ADMIN — ACETAMINOPHEN 650 MG: 325 TABLET, FILM COATED ORAL at 20:14

## 2021-11-26 RX ADMIN — ACETAMINOPHEN 1000 MG: 500 TABLET, FILM COATED ORAL at 07:33

## 2021-11-26 RX ADMIN — OXYCODONE 10 MG: 5 TABLET ORAL at 20:14

## 2021-11-26 RX ADMIN — OXYCODONE 5 MG: 5 TABLET ORAL at 14:01

## 2021-11-26 RX ADMIN — SIMETHICONE 80 MG: 80 TABLET, CHEWABLE ORAL at 18:26

## 2021-11-26 RX ADMIN — IBUPROFEN 600 MG: 600 TABLET ORAL at 18:26

## 2021-11-26 RX ADMIN — SIMETHICONE 80 MG: 80 TABLET, CHEWABLE ORAL at 13:12

## 2021-11-26 RX ADMIN — SIMETHICONE 80 MG: 80 TABLET, CHEWABLE ORAL at 20:14

## 2021-11-26 RX ADMIN — OXYCODONE 5 MG: 5 TABLET ORAL at 13:12

## 2021-11-26 RX ADMIN — KETOROLAC TROMETHAMINE 15 MG: 30 INJECTION, SOLUTION INTRAMUSCULAR; INTRAVENOUS at 00:19

## 2021-11-26 RX ADMIN — ACETAMINOPHEN 1000 MG: 500 TABLET, FILM COATED ORAL at 02:32

## 2021-11-26 RX ADMIN — DOCUSATE SODIUM 100 MG: 100 CAPSULE, LIQUID FILLED ORAL at 20:14

## 2021-11-26 NOTE — ANESTHESIA POSTPROCEDURE EVALUATION
Patient: Santa Ann    Procedure Summary     Date: 21 Room / Location: [facility] LABOR DELIVERY  [facility] LABOR DELIVERY    Anesthesia Start: 929 Anesthesia Stop:     Procedure:  SECTION PRIMARY (N/A Abdomen) Diagnosis:     Surgeons: Ping Heredia DO Provider: Polina Tee DO    Anesthesia Type: spinal, ITN ASA Status: 2 - Emergent          Anesthesia Type: spinal, ITN    Vitals  Vitals Value Taken Time   /74 21 1230   Temp 98.2 °F (36.8 °C) 21 1230   Pulse 78 21 1230   Resp 18 21 1230   SpO2 97 % 21 1127   Vitals shown include unvalidated device data.        Post Anesthesia Care and Evaluation    Patient location during evaluation: bedside  Patient participation: complete - patient participated  Level of consciousness: awake and alert  Pain management: adequate  Airway patency: patent  Anesthetic complications: No anesthetic complications    Cardiovascular status: acceptable  Respiratory status: acceptable  Hydration status: acceptable  Post Neuraxial Block status: Motor and sensory function returned to baseline and No signs or symptoms of PDPH

## 2021-11-27 RX ADMIN — SIMETHICONE 80 MG: 80 TABLET, CHEWABLE ORAL at 07:31

## 2021-11-27 RX ADMIN — ACETAMINOPHEN 650 MG: 325 TABLET, FILM COATED ORAL at 02:08

## 2021-11-27 RX ADMIN — SIMETHICONE 80 MG: 80 TABLET, CHEWABLE ORAL at 20:37

## 2021-11-27 RX ADMIN — OXYCODONE 10 MG: 5 TABLET ORAL at 18:56

## 2021-11-27 RX ADMIN — DOCUSATE SODIUM 100 MG: 100 CAPSULE, LIQUID FILLED ORAL at 07:30

## 2021-11-27 RX ADMIN — ACETAMINOPHEN 650 MG: 325 TABLET, FILM COATED ORAL at 07:30

## 2021-11-27 RX ADMIN — IBUPROFEN 600 MG: 600 TABLET ORAL at 06:15

## 2021-11-27 RX ADMIN — IBUPROFEN 600 MG: 600 TABLET ORAL at 11:07

## 2021-11-27 RX ADMIN — OXYCODONE 10 MG: 5 TABLET ORAL at 13:32

## 2021-11-27 RX ADMIN — PRENATAL VITAMINS-IRON FUMARATE 27 MG IRON-FOLIC ACID 0.8 MG TABLET 1 TABLET: at 07:30

## 2021-11-27 RX ADMIN — ACETAMINOPHEN 650 MG: 325 TABLET, FILM COATED ORAL at 20:37

## 2021-11-27 RX ADMIN — ACETAMINOPHEN 650 MG: 325 TABLET, FILM COATED ORAL at 13:32

## 2021-11-27 RX ADMIN — DOCUSATE SODIUM 100 MG: 100 CAPSULE, LIQUID FILLED ORAL at 20:37

## 2021-11-27 RX ADMIN — OXYCODONE 10 MG: 5 TABLET ORAL at 07:30

## 2021-11-27 RX ADMIN — OXYCODONE 10 MG: 5 TABLET ORAL at 02:08

## 2021-11-27 RX ADMIN — SIMETHICONE 80 MG: 80 TABLET, CHEWABLE ORAL at 11:06

## 2021-11-27 RX ADMIN — IBUPROFEN 600 MG: 600 TABLET ORAL at 18:56

## 2021-11-28 RX ADMIN — OXYCODONE 10 MG: 5 TABLET ORAL at 05:58

## 2021-11-28 RX ADMIN — Medication 1 APPLICATION: at 23:12

## 2021-11-28 RX ADMIN — IBUPROFEN 600 MG: 600 TABLET ORAL at 00:37

## 2021-11-28 RX ADMIN — PRENATAL VITAMINS-IRON FUMARATE 27 MG IRON-FOLIC ACID 0.8 MG TABLET 1 TABLET: at 08:32

## 2021-11-28 RX ADMIN — IBUPROFEN 600 MG: 600 TABLET ORAL at 11:27

## 2021-11-28 RX ADMIN — IBUPROFEN 600 MG: 600 TABLET ORAL at 18:01

## 2021-11-28 RX ADMIN — ACETAMINOPHEN 650 MG: 325 TABLET, FILM COATED ORAL at 13:53

## 2021-11-28 RX ADMIN — OXYCODONE 10 MG: 5 TABLET ORAL at 00:37

## 2021-11-28 RX ADMIN — DOCUSATE SODIUM 100 MG: 100 CAPSULE, LIQUID FILLED ORAL at 08:32

## 2021-11-28 RX ADMIN — SIMETHICONE 80 MG: 80 TABLET, CHEWABLE ORAL at 08:32

## 2021-11-28 RX ADMIN — DOCUSATE SODIUM 100 MG: 100 CAPSULE, LIQUID FILLED ORAL at 20:31

## 2021-11-28 RX ADMIN — OXYCODONE 5 MG: 5 TABLET ORAL at 20:32

## 2021-11-28 RX ADMIN — OXYCODONE 10 MG: 5 TABLET ORAL at 13:53

## 2021-11-28 RX ADMIN — SIMETHICONE 80 MG: 80 TABLET, CHEWABLE ORAL at 20:32

## 2021-11-28 RX ADMIN — ACETAMINOPHEN 650 MG: 325 TABLET, FILM COATED ORAL at 02:49

## 2021-11-28 RX ADMIN — IBUPROFEN 600 MG: 600 TABLET ORAL at 06:00

## 2021-11-28 RX ADMIN — ACETAMINOPHEN 650 MG: 325 TABLET, FILM COATED ORAL at 08:32

## 2021-11-28 RX ADMIN — ACETAMINOPHEN 650 MG: 325 TABLET, FILM COATED ORAL at 20:31

## 2021-11-29 VITALS
WEIGHT: 180 LBS | RESPIRATION RATE: 16 BRPM | TEMPERATURE: 98.7 F | HEART RATE: 67 BPM | SYSTOLIC BLOOD PRESSURE: 139 MMHG | DIASTOLIC BLOOD PRESSURE: 74 MMHG | BODY MASS INDEX: 33.13 KG/M2 | OXYGEN SATURATION: 97 % | HEIGHT: 62 IN

## 2021-11-29 LAB
CYTO UR: NORMAL
LAB AP CASE REPORT: NORMAL
LAB AP CLINICAL INFORMATION: NORMAL
PATH REPORT.FINAL DX SPEC: NORMAL
PATH REPORT.GROSS SPEC: NORMAL

## 2021-11-29 RX ORDER — PSEUDOEPHEDRINE HCL 30 MG
100 TABLET ORAL 2 TIMES DAILY PRN
Qty: 30 CAPSULE | Refills: 1 | Status: SHIPPED | OUTPATIENT
Start: 2021-11-29

## 2021-11-29 RX ORDER — IBUPROFEN 600 MG/1
600 TABLET ORAL EVERY 6 HOURS SCHEDULED
Qty: 60 TABLET | Refills: 0 | Status: SHIPPED | OUTPATIENT
Start: 2021-11-29

## 2021-11-29 RX ORDER — OXYCODONE HYDROCHLORIDE 5 MG/1
5 TABLET ORAL EVERY 4 HOURS PRN
Qty: 12 TABLET | Refills: 0 | Status: SHIPPED | OUTPATIENT
Start: 2021-11-29 | End: 2021-12-02

## 2021-11-29 RX ORDER — FERROUS SULFATE 325(65) MG
325 TABLET ORAL
Qty: 90 TABLET | Refills: 1 | Status: SHIPPED | OUTPATIENT
Start: 2021-11-29

## 2021-11-29 RX ORDER — SIMETHICONE 80 MG
80 TABLET,CHEWABLE ORAL 4 TIMES DAILY PRN
Start: 2021-11-29

## 2021-11-29 RX ORDER — LANOLIN
1 CREAM (ML) TOPICAL
Start: 2021-11-29

## 2021-11-29 RX ORDER — ACETAMINOPHEN 325 MG/1
650 TABLET ORAL EVERY 6 HOURS PRN
Start: 2021-11-29

## 2021-11-29 RX ADMIN — IBUPROFEN 600 MG: 600 TABLET ORAL at 06:14

## 2021-11-29 RX ADMIN — OXYCODONE 10 MG: 5 TABLET ORAL at 12:05

## 2021-11-29 RX ADMIN — SIMETHICONE 80 MG: 80 TABLET, CHEWABLE ORAL at 12:05

## 2021-11-29 RX ADMIN — SIMETHICONE 80 MG: 80 TABLET, CHEWABLE ORAL at 08:48

## 2021-11-29 RX ADMIN — OXYCODONE 5 MG: 5 TABLET ORAL at 03:08

## 2021-11-29 RX ADMIN — ACETAMINOPHEN 650 MG: 325 TABLET, FILM COATED ORAL at 03:07

## 2021-11-29 RX ADMIN — Medication 1 APPLICATION: at 12:05

## 2021-11-29 RX ADMIN — IBUPROFEN 600 MG: 600 TABLET ORAL at 00:18

## 2021-11-29 RX ADMIN — ACETAMINOPHEN 650 MG: 325 TABLET, FILM COATED ORAL at 08:48

## 2021-11-29 RX ADMIN — PRENATAL VITAMINS-IRON FUMARATE 27 MG IRON-FOLIC ACID 0.8 MG TABLET 1 TABLET: at 08:48

## 2021-11-29 RX ADMIN — IBUPROFEN 600 MG: 600 TABLET ORAL at 12:05

## 2021-11-29 RX ADMIN — DOCUSATE SODIUM 100 MG: 100 CAPSULE, LIQUID FILLED ORAL at 08:48

## 2021-12-07 ENCOUNTER — HOSPITAL ENCOUNTER (OUTPATIENT)
Dept: LACTATION | Facility: HOSPITAL | Age: 26
Discharge: HOME | End: 2021-12-07

## 2021-12-08 NOTE — LACTATION NOTE
12/07/21 1530   Maternal Information   Date of Referral 12/07/21  (Baby A Rula)   Person Making Referral other (see comments)  (Pt is currently breastfeeding twins tandem)   Maternal Reason for Referral multiple birth; other (see comments)  (Requests help with breastfeeding twins)   Infant Reason for Referral 35-37 weeks gestation; weight gain inadequate   Maternal Assessment   Breast Size Issue none   Breast Shape Bilateral:; round   Breast Density Bilateral:; full   Nipples Bilateral:; everted   Left Nipple Symptoms intact   Right Nipple Symptoms intact   Maternal Infant Feeding   Maternal Emotional State receptive; independent; other (see comments)  (appears tired)   Infant Positioning clutch/football   Signs of Milk Transfer audible swallow; breasts soften with feeding   Pain with Feeding no   Comfort Measures Before/During Feeding infant position adjusted; latch adjusted; maternal position adjusted; suction broken using finger   Comfort Measures Following Feeding air-drying encouraged; breast cream/oil applied   Prefeeding Nipple Condition pink   Postfeeding Nipple Condition pink   Nipple Shape After Feeding, Right symmetrical; appropriately projected   Latch Assistance minimal assistance   Support Person Involvement actively supporting mother   Feeding Infant   Feeding Readiness Cues rooting; finger sucking  (Baby A Rula)   Satiety Cues sleeping after feeding  (BabyA Rula)   Feeding Tolerance/Success coordinated suck/swallow; arousal required; sleepy  (Baby A Rula)   Effective Latch During Feeding yes  (with nipple shield)   Suck/Swallow Coordination present   Prefeeding Weight (gm) 2400 g (84.7 oz)   Postfeeding Weight (gm) 2450 g (86.4 oz)   Weight Gain/Loss (gm)  50 g (1.8 oz)   Breastfeeding Session   Breastfeeding breastfeeding, right side only   Breastfeeding Time, Right (min) 15   LATCH Score   Latch 2-->grasps breast, tongue down, lips flanged, rhythmic sucking   Audible Swallowing 2-->spontaneous  and intermittent (24 hrs old)   Type of Nipple 2-->everted (after stimulation)   Comfort (Breast/Nipple) 2-->soft/nontender   Hold (Positioning) 1-->minimal assist, teach one side, mother does other, staff holds   Latch Score 9   Milk Expression/Equipment   Breast Pump Type double electric, personal   Breast Pump Flange Type hard   Breast Pump Flange Size 24 mm   Breast Pumping   Breast Pumping Interventions other (see comments)  (Pump every-other-feeding & bottle feed breast milk)   Breast Pumping bilateral breasts pumped until soft   Lactation Referrals   Lactation Referrals pediatric care provider

## 2021-12-08 NOTE — LACTATION NOTE
12/07/21 1540   Maternal Information   Date of Referral 12/07/21  (Baby B Paul)   Person Making Referral patient   Maternal Reason for Referral breastfeeding currently; multiple birth; other (see comments)  (Tandem nursing twins)   Infant Reason for Referral 35-37 weeks gestation  (Twin B Paul)   Maternal Assessment   Breast Size Issue none   Breast Shape Bilateral:; round   Breast Density Bilateral:; full   Nipples Bilateral:; everted   Left Nipple Symptoms intact   Right Nipple Symptoms intact   Maternal Infant Feeding   Maternal Emotional State receptive   Infant Positioning cross-cradle   Signs of Milk Transfer audible swallow   Pain with Feeding no   Comfort Measures Before/During Feeding infant position adjusted; latch adjusted; maternal position adjusted; suction broken using finger   Comfort Measures Following Feeding air-drying encouraged; breast cream/oil applied   Prefeeding Nipple Condition pink   Postfeeding Nipple Condition pink   Nipple Shape After Feeding, Left Breast appropriately projected  (Uses nipple shield with good latch)   Latch Assistance minimal assistance   Support Person Involvement actively supporting mother   Feeding Infant   Feeding Readiness Cues quiet  (Baby B paul)   Satiety Cues sleeping after feeding   Feeding Tolerance/Success coordinated suck/swallow   Effective Latch During Feeding yes   Suck/Swallow Coordination present   Prefeeding Weight (gm) 2585 g (91.2 oz)   Postfeeding Weight (gm) 2630 g (92.8 oz)   Weight Gain/Loss (gm)  45 g (1.6 oz)   Breastfeeding Session   Breastfeeding breastfeeding, left side only   LATCH Score   Latch 2-->grasps breast, tongue down, lips flanged, rhythmic sucking   Audible Swallowing 1-->a few with stimulation   Type of Nipple 2-->everted (after stimulation)   Comfort (Breast/Nipple) 2-->soft/nontender   Hold (Positioning) 1-->minimal assist, teach one side, mother does other, staff holds   Latch Score 8   Milk Expression/Equipment   Breast  Pump Type double electric, personal   Breast Pump Flange Type hard   Breast Pump Flange Size 24 mm   Breast Pumping   Breast Pumping Interventions other (see comments)  (Pump and bottle feed every-other-feed)   Breast Pumping bilateral breasts pumped until soft   Lactation Referrals   Lactation Referrals pediatric care provider  (Follow-up appt with Dr. Rome on Friday 12/10/2021)

## 2021-12-13 ENCOUNTER — APPOINTMENT (OUTPATIENT)
Dept: PREADMISSION TESTING | Facility: HOSPITAL | Age: 26
End: 2021-12-13

## 2023-05-08 ENCOUNTER — TRANSCRIBE ORDERS (OUTPATIENT)
Dept: LAB | Facility: HOSPITAL | Age: 28
End: 2023-05-08
Payer: COMMERCIAL

## 2023-05-11 ENCOUNTER — TRANSCRIBE ORDERS (OUTPATIENT)
Dept: LAB | Facility: HOSPITAL | Age: 28
End: 2023-05-11
Payer: COMMERCIAL

## 2023-05-11 DIAGNOSIS — Z87.59 ULTRASOUND SCAN TO CONFIRM FETAL VIABILITY WITH HISTORY OF MISCARRIAGE: Primary | ICD-10-CM

## 2023-05-11 DIAGNOSIS — O36.80X0 ULTRASOUND SCAN TO CONFIRM FETAL VIABILITY WITH HISTORY OF MISCARRIAGE: Primary | ICD-10-CM

## 2023-05-23 ENCOUNTER — TRANSCRIBE ORDERS (OUTPATIENT)
Dept: LAB | Facility: HOSPITAL | Age: 28
End: 2023-05-23
Payer: COMMERCIAL

## 2024-01-05 ENCOUNTER — HOSPITAL ENCOUNTER (INPATIENT)
Facility: HOSPITAL | Age: 29
LOS: 2 days | Discharge: HOME | End: 2024-01-07
Admitting: OBSTETRICS & GYNECOLOGY
Payer: COMMERCIAL

## 2024-01-05 PROBLEM — Z37.9 NORMAL LABOR: Status: ACTIVE | Noted: 2024-01-05

## 2024-01-05 LAB
ABO GROUP BLD: NORMAL
BLD GP AB SCN SERPL QL: NEGATIVE
DEPRECATED RDW RBC AUTO: 45.1 FL (ref 37–54)
ERYTHROCYTE [DISTWIDTH] IN BLOOD BY AUTOMATED COUNT: 13.6 % (ref 12.3–15.4)
HCT VFR BLD AUTO: 43.9 % (ref 34–46.6)
HGB BLD-MCNC: 15.1 G/DL (ref 12–15.9)
MCH RBC QN AUTO: 31.4 PG (ref 26.6–33)
MCHC RBC AUTO-ENTMCNC: 34.4 G/DL (ref 31.5–35.7)
MCV RBC AUTO: 91.3 FL (ref 79–97)
PLATELET # BLD AUTO: 236 10*3/MM3 (ref 140–450)
PMV BLD AUTO: 12.1 FL (ref 6–12)
RBC # BLD AUTO: 4.81 10*6/MM3 (ref 3.77–5.28)
RH BLD: POSITIVE
T&S EXPIRATION DATE: NORMAL
WBC NRBC COR # BLD AUTO: 11.01 10*3/MM3 (ref 3.4–10.8)

## 2024-01-05 PROCEDURE — 86901 BLOOD TYPING SEROLOGIC RH(D): CPT

## 2024-01-05 PROCEDURE — 59025 FETAL NON-STRESS TEST: CPT

## 2024-01-05 PROCEDURE — 86850 RBC ANTIBODY SCREEN: CPT

## 2024-01-05 PROCEDURE — 85027 COMPLETE CBC AUTOMATED: CPT

## 2024-01-05 PROCEDURE — 86900 BLOOD TYPING SEROLOGIC ABO: CPT

## 2024-01-05 RX ORDER — MAGNESIUM CARB/ALUMINUM HYDROX 105-160MG
30 TABLET,CHEWABLE ORAL ONCE
Status: DISCONTINUED | OUTPATIENT
Start: 2024-01-05 | End: 2024-01-06 | Stop reason: HOSPADM

## 2024-01-05 RX ORDER — SODIUM CHLORIDE 0.9 % (FLUSH) 0.9 %
10 SYRINGE (ML) INJECTION EVERY 12 HOURS SCHEDULED
Status: DISCONTINUED | OUTPATIENT
Start: 2024-01-05 | End: 2024-01-06 | Stop reason: HOSPADM

## 2024-01-05 RX ORDER — TERBUTALINE SULFATE 1 MG/ML
0.25 INJECTION, SOLUTION SUBCUTANEOUS AS NEEDED
Status: DISCONTINUED | OUTPATIENT
Start: 2024-01-05 | End: 2024-01-06 | Stop reason: HOSPADM

## 2024-01-05 RX ORDER — SODIUM CHLORIDE, SODIUM LACTATE, POTASSIUM CHLORIDE, CALCIUM CHLORIDE 600; 310; 30; 20 MG/100ML; MG/100ML; MG/100ML; MG/100ML
125 INJECTION, SOLUTION INTRAVENOUS CONTINUOUS
Status: DISCONTINUED | OUTPATIENT
Start: 2024-01-05 | End: 2024-01-06

## 2024-01-05 RX ORDER — LIDOCAINE HYDROCHLORIDE 10 MG/ML
INJECTION, SOLUTION INFILTRATION; PERINEURAL
Status: COMPLETED
Start: 2024-01-05 | End: 2024-01-06

## 2024-01-05 RX ORDER — ACETAMINOPHEN 325 MG/1
650 TABLET ORAL EVERY 4 HOURS PRN
Status: DISCONTINUED | OUTPATIENT
Start: 2024-01-05 | End: 2024-01-06 | Stop reason: HOSPADM

## 2024-01-05 RX ORDER — SODIUM CHLORIDE 9 MG/ML
40 INJECTION, SOLUTION INTRAVENOUS AS NEEDED
Status: DISCONTINUED | OUTPATIENT
Start: 2024-01-05 | End: 2024-01-06 | Stop reason: HOSPADM

## 2024-01-05 RX ORDER — OXYTOCIN/0.9 % SODIUM CHLORIDE 30/500 ML
PLASTIC BAG, INJECTION (ML) INTRAVENOUS
Status: DISCONTINUED
Start: 2024-01-05 | End: 2024-01-07 | Stop reason: HOSPADM

## 2024-01-05 RX ORDER — SODIUM CHLORIDE 0.9 % (FLUSH) 0.9 %
10 SYRINGE (ML) INJECTION AS NEEDED
Status: DISCONTINUED | OUTPATIENT
Start: 2024-01-05 | End: 2024-01-06 | Stop reason: HOSPADM

## 2024-01-05 RX ORDER — EPHEDRINE SULFATE 5 MG/ML
INJECTION INTRAVENOUS
Status: DISCONTINUED
Start: 2024-01-05 | End: 2024-01-06

## 2024-01-05 RX ORDER — LIDOCAINE HYDROCHLORIDE 10 MG/ML
0.5 INJECTION, SOLUTION EPIDURAL; INFILTRATION; INTRACAUDAL; PERINEURAL ONCE AS NEEDED
Status: DISCONTINUED | OUTPATIENT
Start: 2024-01-05 | End: 2024-01-06 | Stop reason: HOSPADM

## 2024-01-05 NOTE — H&P
"[facility]  Obstetric History and Physical    Chief Complaint   Patient presents with    Contractions       Subjective     Patient is a 28 y.o. female  currently at 40w3d, who presents for labor contractions since 0600. She plans natural labor. She has a  at bedside. She has refused VE since arrival. Has not had US to confirm presentation    Her prenatal care is complicated by  prior  for twins desires .  Her previous obstetric/gynecological history is otherwise noncontributory.    The following portions of the patients history were reviewed and updated as appropriate: current medications, allergies, past medical history, past surgical history, past family history, past social history, and problem list .       Prenatal Information:   Maternal Prenatal Labs  Blood Type No results found for: \"ABO\"   Rh Status No results found for: \"RH\"   Antibody Screen No results found for: \"ABSCRN\"   Gonnorhea No results found for: \"GCCX\"   Chlamydia No results found for: \"CLAMYDCU\"   RPR No results found for: \"RPR\"   Syphilis Antibody No results found for: \"SYPHILIS\"   Rubella No results found for: \"RUBELLAIGGIN\"   Hepatitis B Surface Antigen No results found for: \"HEPBSAG\"   HIV-1 Antibody No results found for: \"LABHIV1\"   Hepatitis C Antibody No results found for: \"HEPCAB\"   Rapid [place] Drug Screen No results found for: \"AMPMETHU\", \"BARBITSCNUR\", \"LABBENZSCN\", \"LABMETHSCN\", \"LABOPIASCN\", \"THCURSCR\", \"COCAINEUR\", \"AMPHETSCREEN\", \"PROPOXSCN\", \"BUPRENORSCNU\", \"METAMPSCNUR\", \"OXYCODONESCN\", \"TRICYCLICSCN\"   Group B Strep Culture No results found for: \"GBSANTIGEN\"                 Past OB History:       OB History    Para Term  AB Living   2 1 0 1 0 2   SAB IAB Ectopic Molar Multiple Live Births   0 0 0 0 1 2      # Outcome Date GA Lbr Tomas/2nd Weight Sex Delivery Anes PTL Lv   2 Current            1A  21 35w2d  2280 g (5 lb 0.4 oz) F CS-LTranv Spinal Y LEDA      Complications: Twins " live born in hospital,  premature rupture of membranes with onset of labor within 24 hours of rupture in third trimester      Name: HERVE MERRITT      Apgar1: 8  Apgar5: 9   1B  21 35w2d  2708 g (5 lb 15.5 oz) M CS-LTranv Spinal Y LEDA      Complications: Twins live born in hospital,  premature rupture of membranes with onset of labor within 24 hours of rupture in third trimester      Name: THAD MERRITT      Apgar1: 8  Apgar5: 9       Past Medical History: History reviewed. No pertinent past medical history.   Past Surgical History Past Surgical History:   Procedure Laterality Date     SECTION N/A 2021    Procedure:  SECTION PRIMARY;  Surgeon: Ping Heredia DO;  [facility];  Service: Obstetrics/Gynecology;  Laterality: N/A;      Family History: History reviewed. No pertinent family history.   Social History:  reports that she has never smoked. She does not have any smokeless tobacco history on file.   reports that she does not currently use alcohol.   reports no history of drug use.        REVIEW OF SYSTEMS              Reports fetal movement is normal             Reports leakage of amniotic fluid.             Denies vaginal bleeding             She reports Regular contractions,              All other systems reviewed and are negative    Objective       Vital Signs Range for the last 24 hours  Temperature: Temp:  [98.3 °F (36.8 °C)] 98.3 °F (36.8 °C)   Temp Source: Temp src: Oral   BP: BP: (133)/(83) 133/83   Pulse: Heart Rate:  [106] 106   Respirations: Resp:  [16] 16   SPO2:     O2 Amount (l/min):     O2 Devices     Weight:         Presentation: Patient refuses   Cervix: Exam by:     Dilation:     Effacement:     Station:       Fetal Heart Rate Assessment   Method:  external   Beats/min:     Baseline:  normal   Variability:  mod   Accels:  yes   Decels:  no   Tracing Category:  1     N  Constitutional:  Well developed, well  nourished, no acute distress, well-groomed.   Respiratory:  normal resp effort   Cardiovascular:  Normal rate  Gastrointestinal:  Soft, gravid, nontender.  Uterus: Soft, nontender. Fundus appropriate for dates.  Neurologic:  Alert & oriented x 3,  no focal deficits noted.   Psychiatric:  Speech and behavior appropriate.           Labs:  Lab Results   Component Value Date    WBC 11.01 (H) 01/05/2024    HGB 15.1 01/05/2024    HCT 43.9 01/05/2024    MCV 91.3 01/05/2024     01/05/2024    POCGLU 146 (H) 10/11/2023    CREATININE 0.74 11/25/2021    URICACID 5.3 11/25/2021    AST 18 11/25/2021    ALT 7 11/25/2021     11/25/2021               Assessment & Plan       Normal labor        Assessment:  1.  Intrauterine pregnancy at 40w3d weeks gestation with reactive fetal status.    2.   term labor  with ROM - mec  3.  Obstetrical history significant for previous C/S  4.  GBS status: neg  5. TOLAC    Plan:  1.Has been counseled on risk benefits of tolac. BSUS confirms vertex. Saline lock per pt request. Discussed mec. Plan DRT at bedside. Has been educated extensively on TOLAC and desires to continue with plan. Continuous monitoring.   2. Plan of care has been reviewed with patient and questions answered.  3.  Risks, benefits of treatment plan have been discussed.  4.  All questions have been answered.  5. Cares transferred to Dr. Parada, updated on patient.         Emely Myers CNM  1/5/2024  17:55 EST

## 2024-01-06 PROBLEM — O34.219 VBAC (VAGINAL BIRTH AFTER CESAREAN): Status: ACTIVE | Noted: 2024-01-06

## 2024-01-06 PROBLEM — Z37.9 NORMAL LABOR: Status: RESOLVED | Noted: 2024-01-05 | Resolved: 2024-01-06

## 2024-01-06 PROCEDURE — 0KQM0ZZ REPAIR PERINEUM MUSCLE, OPEN APPROACH: ICD-10-PCS | Performed by: OBSTETRICS & GYNECOLOGY

## 2024-01-06 PROCEDURE — 59025 FETAL NON-STRESS TEST: CPT

## 2024-01-06 PROCEDURE — 25010000002 LIDOCAINE 1 % SOLUTION

## 2024-01-06 RX ORDER — OXYTOCIN/0.9 % SODIUM CHLORIDE 30/500 ML
999 PLASTIC BAG, INJECTION (ML) INTRAVENOUS ONCE
Status: DISCONTINUED | OUTPATIENT
Start: 2024-01-06 | End: 2024-01-07 | Stop reason: HOSPADM

## 2024-01-06 RX ORDER — ONDANSETRON 2 MG/ML
4 INJECTION INTRAMUSCULAR; INTRAVENOUS EVERY 6 HOURS PRN
Status: DISCONTINUED | OUTPATIENT
Start: 2024-01-06 | End: 2024-01-07 | Stop reason: HOSPADM

## 2024-01-06 RX ORDER — HYDROCODONE BITARTRATE AND ACETAMINOPHEN 5; 325 MG/1; MG/1
1 TABLET ORAL EVERY 4 HOURS PRN
Status: DISCONTINUED | OUTPATIENT
Start: 2024-01-06 | End: 2024-01-07 | Stop reason: HOSPADM

## 2024-01-06 RX ORDER — HYDROCODONE BITARTRATE AND ACETAMINOPHEN 10; 325 MG/1; MG/1
1 TABLET ORAL EVERY 4 HOURS PRN
Status: DISCONTINUED | OUTPATIENT
Start: 2024-01-06 | End: 2024-01-07 | Stop reason: HOSPADM

## 2024-01-06 RX ORDER — SIMETHICONE 80 MG
80 TABLET,CHEWABLE ORAL 4 TIMES DAILY PRN
Status: DISCONTINUED | OUTPATIENT
Start: 2024-01-06 | End: 2024-01-07 | Stop reason: HOSPADM

## 2024-01-06 RX ORDER — METHYLERGONOVINE MALEATE 0.2 MG/ML
200 INJECTION INTRAVENOUS ONCE AS NEEDED
Status: DISCONTINUED | OUTPATIENT
Start: 2024-01-06 | End: 2024-01-06 | Stop reason: HOSPADM

## 2024-01-06 RX ORDER — BISACODYL 10 MG
10 SUPPOSITORY, RECTAL RECTAL DAILY PRN
Status: DISCONTINUED | OUTPATIENT
Start: 2024-01-07 | End: 2024-01-07 | Stop reason: HOSPADM

## 2024-01-06 RX ORDER — ONDANSETRON 4 MG/1
4 TABLET, ORALLY DISINTEGRATING ORAL EVERY 6 HOURS PRN
Status: DISCONTINUED | OUTPATIENT
Start: 2024-01-06 | End: 2024-01-07 | Stop reason: HOSPADM

## 2024-01-06 RX ORDER — ACETAMINOPHEN 325 MG/1
650 TABLET ORAL EVERY 6 HOURS PRN
Status: DISCONTINUED | OUTPATIENT
Start: 2024-01-06 | End: 2024-01-07 | Stop reason: HOSPADM

## 2024-01-06 RX ORDER — SODIUM CHLORIDE 0.9 % (FLUSH) 0.9 %
1-10 SYRINGE (ML) INJECTION AS NEEDED
Status: DISCONTINUED | OUTPATIENT
Start: 2024-01-06 | End: 2024-01-07 | Stop reason: HOSPADM

## 2024-01-06 RX ORDER — IBUPROFEN 600 MG/1
600 TABLET ORAL EVERY 6 HOURS PRN
Status: DISCONTINUED | OUTPATIENT
Start: 2024-01-06 | End: 2024-01-06 | Stop reason: HOSPADM

## 2024-01-06 RX ORDER — MISOPROSTOL 200 UG/1
800 TABLET ORAL AS NEEDED
Status: DISCONTINUED | OUTPATIENT
Start: 2024-01-06 | End: 2024-01-06 | Stop reason: HOSPADM

## 2024-01-06 RX ORDER — ACETAMINOPHEN 325 MG/1
650 TABLET ORAL EVERY 4 HOURS PRN
Status: DISCONTINUED | OUTPATIENT
Start: 2024-01-06 | End: 2024-01-06 | Stop reason: HOSPADM

## 2024-01-06 RX ORDER — DOCUSATE SODIUM 100 MG/1
100 CAPSULE, LIQUID FILLED ORAL 2 TIMES DAILY
Status: DISCONTINUED | OUTPATIENT
Start: 2024-01-06 | End: 2024-01-07 | Stop reason: HOSPADM

## 2024-01-06 RX ORDER — OXYTOCIN/0.9 % SODIUM CHLORIDE 30/500 ML
125 PLASTIC BAG, INJECTION (ML) INTRAVENOUS CONTINUOUS PRN
Status: DISCONTINUED | OUTPATIENT
Start: 2024-01-06 | End: 2024-01-07 | Stop reason: HOSPADM

## 2024-01-06 RX ORDER — POLYETHYLENE GLYCOL 3350 17 G/17G
17 POWDER, FOR SOLUTION ORAL DAILY PRN
Status: DISCONTINUED | OUTPATIENT
Start: 2024-01-06 | End: 2024-01-07 | Stop reason: HOSPADM

## 2024-01-06 RX ORDER — HYDROCORTISONE 25 MG/G
1 CREAM TOPICAL AS NEEDED
Status: DISCONTINUED | OUTPATIENT
Start: 2024-01-06 | End: 2024-01-07 | Stop reason: HOSPADM

## 2024-01-06 RX ORDER — IBUPROFEN 600 MG/1
600 TABLET ORAL EVERY 6 HOURS PRN
Status: DISCONTINUED | OUTPATIENT
Start: 2024-01-06 | End: 2024-01-07 | Stop reason: HOSPADM

## 2024-01-06 RX ORDER — PRENATAL VIT/IRON FUM/FOLIC AC 27MG-0.8MG
1 TABLET ORAL DAILY
Status: DISCONTINUED | OUTPATIENT
Start: 2024-01-06 | End: 2024-01-07 | Stop reason: HOSPADM

## 2024-01-06 RX ORDER — OXYTOCIN/0.9 % SODIUM CHLORIDE 30/500 ML
250 PLASTIC BAG, INJECTION (ML) INTRAVENOUS CONTINUOUS
Status: ACTIVE | OUTPATIENT
Start: 2024-01-06 | End: 2024-01-06

## 2024-01-06 RX ORDER — CARBOPROST TROMETHAMINE 250 UG/ML
250 INJECTION, SOLUTION INTRAMUSCULAR AS NEEDED
Status: DISCONTINUED | OUTPATIENT
Start: 2024-01-06 | End: 2024-01-06 | Stop reason: HOSPADM

## 2024-01-06 RX ADMIN — IBUPROFEN 600 MG: 600 TABLET, FILM COATED ORAL at 05:48

## 2024-01-06 RX ADMIN — ACETAMINOPHEN 650 MG: 325 TABLET ORAL at 05:48

## 2024-01-06 RX ADMIN — IBUPROFEN 600 MG: 600 TABLET, FILM COATED ORAL at 17:43

## 2024-01-06 RX ADMIN — WITCH HAZEL: 500 SOLUTION RECTAL; TOPICAL at 10:14

## 2024-01-06 RX ADMIN — ACETAMINOPHEN 650 MG: 325 TABLET ORAL at 10:45

## 2024-01-06 RX ADMIN — DOCUSATE SODIUM 100 MG: 100 CAPSULE, LIQUID FILLED ORAL at 17:43

## 2024-01-06 RX ADMIN — BENZOCAINE AND LEVOMENTHOL: 200; 5 SPRAY TOPICAL at 10:13

## 2024-01-06 RX ADMIN — LIDOCAINE HYDROCHLORIDE 50 ML: 10 INJECTION, SOLUTION INFILTRATION; PERINEURAL at 03:54

## 2024-01-06 RX ADMIN — ACETAMINOPHEN 650 MG: 325 TABLET ORAL at 21:26

## 2024-01-06 NOTE — PROGRESS NOTES
Pt has been pushing in various positions (hands/knees, side-lying, squat bar) for over an hour. Minimal change in fetal station noted. Pt, , and  at bedside desire to continue pushing. FHR reassuring, moderate variability noted with accelerations present, rare variable deceleration.

## 2024-01-06 NOTE — PROGRESS NOTES
01/05/24  23:39 EST  Santa Ann    SUBJECTIVE: Santa is breathing through ctx,  and  still supportive at bedside.     ASSESSMENTS:     /81   Pulse (!) 131   Temp 97.8 °F (36.6 °C) (Oral)   Resp 16     Fetal Heart Rate Assessment   Method: Fetal HR Assessment Method: external   Beats/min: Fetal HR (beats/min): 155   Baseline: Fetal HR Baseline: normal range   Variability: Fetal HR Variability: moderate (amplitude range 6 to 25 bpm)   Accels: Fetal HR Accelerations: greater than/equal to 15 bpm, lasting at least 15 seconds   Decels: Fetal HR Decelerations: variable   Tracing Category:       Uterine Assessment   Method: Method: external tocotransducer, per patient report, palpation   Frequency (min): Contraction Frequency (Minutes): 3-4   Ctx Count in 10 min:     Duration:     Intensity: Contraction Intensity: strong by palpation   Intensity by IUPC:     Resting Tone: Uterine Resting Tone: soft by palpation   Resting Tone by IUPC:       Presentation: vtx   Cervix: Exam by: Method: sterile exam per physician   Dilation:  9-10cm   Effacement: Cervical Effacement: 90%   Station:  0            PLAN: SVE as noted, anterior lip. FHR overall reassuring, difficult tracing due to maternal movement. Continue expectant management, expect pushing soon.    Ana Parada MD  23:39 EST  01/05/24

## 2024-01-06 NOTE — LACTATION NOTE
01/06/24 1300   Maternal Information   Person Making Referral lactation consultant  (courtesy visit, newly postpartum)   Maternal Reason for Referral other (see comments)  (pt reports she  previous twins and pumped to 15months (now 2 years old) with adequate milk production for both children)   Infant Reason for Referral other (see comments)  (pt reports this infant is latching well; she just recently finished breastfeeding)   Maternal Infant Feeding   Maternal Emotional State independent;receptive;relaxed   Pain with Feeding no  (per pt report)   Milk Expression/Equipment   Breast Pump Type double electric, personal  (Spectra)     Completed breastfeeding education encouraging pt to achieve a deep, comfortable latch throughout breastfeeding, which should be at least every 3 hours while giving baby stimulation for high quality transfer of breastmilk. Alternatively, pumping encouraged every three hours, or at baby's feeding times for optimal milk initiation/production. All questions answered at this time, PRN Lactation Consultant/Clinic contact encouraged

## 2024-01-06 NOTE — PROGRESS NOTES
01/05/24  22:19 EST  Santa Ann    SUBJECTIVE: Santa is breathing through ctx with  and  at bedside.     ASSESSMENTS:     /81   Pulse (!) 131   Temp 97.8 °F (36.6 °C) (Oral)   Resp 16     Fetal Heart Rate Assessment   Method: Fetal HR Assessment Method: external   Beats/min: Fetal HR (beats/min): 130   Baseline: Fetal HR Baseline: normal range   Variability: Fetal HR Variability: moderate (amplitude range 6 to 25 bpm)   Accels: Fetal HR Accelerations: greater than/equal to 15 bpm, lasting at least 15 seconds   Decels: Fetal HR Decelerations: absent   Tracing Category:  I     Uterine Assessment   Method: Method: external tocotransducer   Frequency (min): Contraction Frequency (Minutes): 3-4 (poor contraction tracing pattern, RN at bedside and palpated and timed to determine contraction pattern and duration)   Ctx Count in 10 min:     Duration:     Intensity: Contraction Intensity: strong by palpation   Intensity by IUPC:     Resting Tone: Uterine Resting Tone: soft by palpation   Resting Tone by IUPC:       Presentation: vtx   Cervix: Exam by: Method: sterile exam per physician   Dilation:  7-8cm   Effacement: Cervical Effacement: 80%   Station:  -1            PLAN: FHR reassuring, periods of tachycardia which return to normal baseline, no maternal fever, moderate variability noted. Pt had previously declined SVE since admission but consented to exam, results as noted above. Continue expectant management.    Ana Parada MD  22:19 EST  01/05/24

## 2024-01-06 NOTE — L&D DELIVERY NOTE
" [facility]   Vaginal Delivery Note    Patient Name: Santa Ann  : 1995  MRN: [id number]    Date of Delivery: 2024     Diagnosis     Pre & Post-Delivery:  Intrauterine pregnancy at 40w4d  Labor status: Spontaneous Onset of Labor      (vaginal birth after )             Problem List    Transfer to Postpartum     Review the Delivery Report for details.     Delivery     Delivery: , Spontaneous     YOB: 2025    Time of Birth:  Gestational Age 2:56 AM   40w4d     Anesthesia: None     Delivering clinician: Ana Parada    Forceps?   No   Vacuum? No    Shoulder dystocia present: No        Delivery narrative:  Uncomplicated  without anesthesia at 40w4d over a second degree laceration. Anterior shoulder delivered with ease. Infant vigorous at delivery so placed on maternal abdomen. Delayed cord clamping x 30 min per patient request. Cord doubly clamped and cut by father. Cord blood and cord segment obtained. Placenta delivered spontaneously and appeared intact. Laceration repaired in standard fashion after administration of local lidocaine analgesia using 3-0 vicryl. Bilateral labial lacerations were also noted but were hemostatic and did not require repair.        Infant     Findings: female  infant     Infant observations: Weight: No birth weight on file.   Length:   in  Observations/Comments:        Apgars:  8 @ 1 minute /     9 @ 5 minutes   Infant Name: Elysia     Placenta & Cord         Placenta delivered  Spontaneous  at   2024  3:34 AM     Cord:   present.   Nuchal Cord?  no   Cord blood obtained: Yes    Cord gases obtained:  No    Cord gas results: Venous:  No results found for: \"PHCVEN\", \"BECVEN\"    Arterial:  No results found for: \"PHCART\", \"BECART\"     Repair     Episiotomy: None     No    Lacerations: Yes  Laceration Information  Laceration Repaired?   Perineal: 2nd  Yes    Periurethral:       Labial: bilateral  No    Sulcus:       Vaginal:   "     Cervical:         Suture used for repair: 3-0 Vicryl     Estimated Blood Loss: Est. Blood Loss (mL): 200 mL (Filed from Delivery Summary) (01/06/24 3533)     Complications     none    Disposition     Mother to Mother Baby/Postpartum  in stable condition currently.  Baby remains with mom in stable condition currently.    Ana Parada MD  01/06/24  04:09 EST

## 2024-01-07 VITALS
SYSTOLIC BLOOD PRESSURE: 115 MMHG | HEART RATE: 91 BPM | RESPIRATION RATE: 16 BRPM | DIASTOLIC BLOOD PRESSURE: 59 MMHG | TEMPERATURE: 98.3 F

## 2024-01-07 LAB
HCT VFR BLD AUTO: 32.8 % (ref 34–46.6)
HGB BLD-MCNC: 11.2 G/DL (ref 12–15.9)

## 2024-01-07 PROCEDURE — 85018 HEMOGLOBIN: CPT | Performed by: OBSTETRICS & GYNECOLOGY

## 2024-01-07 PROCEDURE — 85014 HEMATOCRIT: CPT | Performed by: OBSTETRICS & GYNECOLOGY

## 2024-01-07 RX ORDER — IBUPROFEN 600 MG/1
600 TABLET ORAL EVERY 6 HOURS PRN
Qty: 60 TABLET | Refills: 0 | Status: SHIPPED | OUTPATIENT
Start: 2024-01-07

## 2024-01-07 RX ORDER — ACETAMINOPHEN 325 MG/1
650 TABLET ORAL EVERY 6 HOURS PRN
Qty: 30 TABLET | Refills: 0
Start: 2024-01-07

## 2024-01-07 RX ORDER — PSEUDOEPHEDRINE HCL 30 MG
100 TABLET ORAL 2 TIMES DAILY PRN
Qty: 60 CAPSULE | Refills: 1 | Status: SHIPPED | OUTPATIENT
Start: 2024-01-07

## 2024-01-07 RX ADMIN — PRENATAL VITAMINS-IRON FUMARATE 27 MG IRON-FOLIC ACID 0.8 MG TABLET 1 TABLET: at 08:52

## 2024-01-07 RX ADMIN — IBUPROFEN 600 MG: 600 TABLET, FILM COATED ORAL at 08:52

## 2024-01-07 RX ADMIN — DOCUSATE SODIUM 100 MG: 100 CAPSULE, LIQUID FILLED ORAL at 08:52

## 2024-01-07 NOTE — LACTATION NOTE
01/07/24 1250   Maternal Information   Date of Referral 01/07/24   Person Making Referral nurse;patient   Maternal Reason for Referral latch difficulty  (Painful latch)   Infant Reason for Referral   (Mom reports baby has been breast-feeding better.  Pain only at the beginning of breast-feeding.)   Milk Expression/Equipment   Breast Pump Type double electric, personal  (Mom has a personal Spectra pump at home and knows how to use it.)   Breast Pumping   Breast Pumping Interventions   (Can pump after feedings to help with milk supply.)     Encouraged to call lactation services if there are questions or concerns, or if mom wants an outpatient clinic appointment.  Discussed latch and position, milk supply nipple pain and follow-up with pediatrician.

## 2024-01-07 NOTE — DISCHARGE SUMMARY
[facility]  Vaginal Delivery Discharge Summary      Patient: Santa Ann      MR#:[id number]  Admission  Diagnosis: IUP @ 40w3d, labor, previous  x 1  Discharge Diagnosis: PPD#1 s/p  @ 40w4d    Date of Admission: 2024  Date of Discharge:  2024    Procedures:  , Spontaneous     2024    2:56 AM      Service:  Obstetrics    Hospital Course:  Patient underwent vaginal delivery () and remained in the hospital for 2 days.  During that time she remained afebrile and hemodynamically stable.  On the day of discharge, she was eating, ambulating and voiding without difficulty.      Lab Results   Component Value Date    WBC 11.01 (H) 2024    HGB 11.2 (L) 2024    HCT 32.8 (L) 2024    MCV 91.3 2024     2024    POCGLU 146 (H) 10/11/2023    CREATININE 0.74 2021    URICACID 5.3 2021    AST 18 2021    ALT 7 2021     2021     Results from last 7 days   Lab Units 24  1822   ABO TYPING  O   RH TYPING  Positive   ANTIBODY SCREEN  Negative       Discharge Medications     Discharge Medications        Changes to Medications        Instructions Start Date   docusate sodium 100 MG capsule  What changed: reasons to take this   100 mg, Oral, 2 Times Daily PRN      ibuprofen 600 MG tablet  Commonly known as: ADVIL,MOTRIN  What changed:   when to take this  reasons to take this   600 mg, Oral, Every 6 Hours PRN             Continue These Medications        Instructions Start Date   acetaminophen 325 MG tablet  Commonly known as: TYLENOL   650 mg, Oral, Every 6 Hours PRN      lanolin cream   1 application , Topical, Every 1 Hour PRN      Prenatal 27-1 27-1 MG tablet tablet   Oral, Daily      simethicone 80 MG chewable tablet  Commonly known as: MYLICON   80 mg, Oral, 4 Times Daily PRN             Stop These Medications      FeroSul 325 (65 Fe) MG tablet  Generic drug: ferrous sulfate              Discharge Disposition:   To Home    Discharge Condition:  Stable    Discharge Diet: Regular    Activity at Discharge: Pelvic rest    Follow-up Appointments  6 weeks      Ana Parada MD  01/07/24  12:25 EST

## (undated) DEVICE — SOL IRR H2O BTL 1000ML STRL

## (undated) DEVICE — SUT PLAIN  3/0 CT1 27IN 842H

## (undated) DEVICE — COATED VICRYL  (POLYGLACTIN 910) SUTURE, VIOLET BRAIDED, STERILE, SYNTHETIC ABSORBABLE SUTURE: Brand: COATED VICRYL

## (undated) DEVICE — SKIN AFFIX SURG ADHESIVE 72/CS 0.55ML: Brand: MEDLINE

## (undated) DEVICE — PK C/SECT 10

## (undated) DEVICE — MAT PREVALON MOBL TRANSFR AIR WO/PAD 39X80IN

## (undated) DEVICE — SUT MONOCRYL SZ 4 0 18IN PS1 Y682H BX/36

## (undated) DEVICE — SOL IRR NACL 0.9PCT BT 1000ML

## (undated) DEVICE — SUT VIC 2/0 CT1 27IN J339H BX/36

## (undated) DEVICE — GLV SURG BIOGEL LTX PF 6

## (undated) DEVICE — TRY SPINE BLCK WHITACRE 25G 3X5IN

## (undated) DEVICE — SUT MNCRYL 1/0 CT1 36IN Y947H BX/36